# Patient Record
(demographics unavailable — no encounter records)

---

## 2025-03-18 NOTE — HISTORY OF PRESENT ILLNESS
[de-identified] : 1/30/24: 73F HERE FOR   3.18.25 PATIENT HERE FOR LEFT HIP. SINCE THE LAST VISIT PATIENT STATES HER RIGHT HIP BEGAN HURTING PAIN TO RIGHT HIP OCCASIONAL AND IN GROIN, NOT WAKING FROM SLEEP,

## 2025-03-18 NOTE — IMAGING
[Left] : left hip with pelvis [All Views] : anteroposterior, lateral [Components well fixed, in good position] : Components well fixed, in good position

## 2025-03-18 NOTE — ASSESSMENT
[FreeTextEntry1] : S/P LEFT CHATO 2/22/21. S/P LT HIP DISLOCATION 5/3/21. DOING WELL. XRAYS REVIEWED WITH COMPONENTS WELL FIXED.   73 year F WITH MODERATE RT HIP PAIN. PAIN IS IN THE GROIN AND DOES NOT RADIATE. PAIN WORSENS WITH WALKING PROLONGED DISTANCES. PAIN IS AFFECTING ADL AND FUNCTIONAL ACTIVITIES, PUTTING ON SHOES AND SOCKS. XRAYS REVIEWED WITH ADVANCED OA. TREATMENT OPTIONS REVIEWED. QUESTIONS ANSWERED.  PT WOULD LIKE TO WAIT FOR ANY TREATMENTS AT THIS TIME